# Patient Record
Sex: MALE | Race: WHITE | NOT HISPANIC OR LATINO | Employment: UNEMPLOYED | URBAN - METROPOLITAN AREA
[De-identification: names, ages, dates, MRNs, and addresses within clinical notes are randomized per-mention and may not be internally consistent; named-entity substitution may affect disease eponyms.]

---

## 2020-02-21 ENCOUNTER — HOSPITAL ENCOUNTER (OUTPATIENT)
Dept: TELEMEDICINE | Facility: HOSPITAL | Age: 50
Discharge: HOME | End: 2020-02-21

## 2020-02-21 PROCEDURE — G0425 INPT/ED TELECONSULT30: HCPCS | Mod: GT,,, | Performed by: PSYCHIATRY & NEUROLOGY

## 2020-02-21 PROCEDURE — G0425 PR INPT TELEHEALTH CONSULT 30M: ICD-10-PCS | Mod: GT,,, | Performed by: PSYCHIATRY & NEUROLOGY

## 2020-02-22 ENCOUNTER — HOSPITAL ENCOUNTER (OUTPATIENT)
Dept: TELEMEDICINE | Facility: HOSPITAL | Age: 50
Discharge: HOME | End: 2020-02-22

## 2020-02-22 PROCEDURE — G0425 INPT/ED TELECONSULT30: HCPCS | Mod: GT,,, | Performed by: PSYCHIATRY & NEUROLOGY

## 2020-02-22 PROCEDURE — G0425 PR INPT TELEHEALTH CONSULT 30M: ICD-10-PCS | Mod: GT,,, | Performed by: PSYCHIATRY & NEUROLOGY
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
## 2020-06-27 PROBLEM — F31.9 BIPOLAR DISORDER, UNSPECIFIED: Status: ACTIVE | Noted: 2020-06-27

## 2020-06-27 PROBLEM — F10.20 ALCOHOL USE DISORDER, MODERATE, DEPENDENCE: Status: ACTIVE | Noted: 2020-06-27

## 2020-06-28 PROBLEM — F31.2 BIPOLAR I DISORDER, CURRENT OR MOST RECENT EPISODE MANIC, WITH PSYCHOTIC FEATURES: Status: ACTIVE | Noted: 2020-06-28

## 2020-06-28 PROBLEM — Z86.711 HISTORY OF PULMONARY EMBOLISM: Status: ACTIVE | Noted: 2020-06-28

## 2020-06-28 PROBLEM — I25.10 CAD (CORONARY ARTERY DISEASE): Status: ACTIVE | Noted: 2020-06-28

## 2020-06-28 PROBLEM — F31.9 BIPOLAR DISORDER, UNSPECIFIED: Status: RESOLVED | Noted: 2020-06-27 | Resolved: 2020-06-28

## 2020-06-28 PROBLEM — E78.5 HLD (HYPERLIPIDEMIA): Status: ACTIVE | Noted: 2020-06-28

## 2020-06-28 PROBLEM — I10 HTN (HYPERTENSION): Status: ACTIVE | Noted: 2020-06-28

## 2020-06-28 PROBLEM — K21.9 GERD (GASTROESOPHAGEAL REFLUX DISEASE): Status: ACTIVE | Noted: 2020-06-28

## 2020-06-30 PROBLEM — R41.9 NEUROCOGNITIVE DISORDER: Status: ACTIVE | Noted: 2020-06-30

## 2020-07-03 PROBLEM — F17.200 TOBACCO USE DISORDER: Status: ACTIVE | Noted: 2020-07-03

## 2020-07-03 PROBLEM — R74.01 TRANSAMINITIS: Status: ACTIVE | Noted: 2020-07-03
